# Patient Record
Sex: MALE | Race: WHITE | NOT HISPANIC OR LATINO | Employment: FULL TIME | URBAN - METROPOLITAN AREA
[De-identification: names, ages, dates, MRNs, and addresses within clinical notes are randomized per-mention and may not be internally consistent; named-entity substitution may affect disease eponyms.]

---

## 2024-08-05 ENCOUNTER — HOSPITAL ENCOUNTER (EMERGENCY)
Facility: HOSPITAL | Age: 54
Discharge: HOME | End: 2024-08-05
Attending: EMERGENCY MEDICINE
Payer: COMMERCIAL

## 2024-08-05 VITALS
RESPIRATION RATE: 16 BRPM | SYSTOLIC BLOOD PRESSURE: 110 MMHG | DIASTOLIC BLOOD PRESSURE: 70 MMHG | TEMPERATURE: 98 F | HEIGHT: 68 IN | WEIGHT: 200 LBS | HEART RATE: 79 BPM | OXYGEN SATURATION: 97 % | BODY MASS INDEX: 30.31 KG/M2

## 2024-08-05 DIAGNOSIS — M54.42 ACUTE LEFT-SIDED LOW BACK PAIN WITH LEFT-SIDED SCIATICA: Primary | ICD-10-CM

## 2024-08-05 PROCEDURE — 99284 EMERGENCY DEPT VISIT MOD MDM: CPT | Mod: 25

## 2024-08-05 PROCEDURE — 63600175 PHARM REV CODE 636 W HCPCS: Performed by: EMERGENCY MEDICINE

## 2024-08-05 PROCEDURE — 96372 THER/PROPH/DIAG INJ SC/IM: CPT | Performed by: EMERGENCY MEDICINE

## 2024-08-05 RX ORDER — KETOROLAC TROMETHAMINE 30 MG/ML
15 INJECTION, SOLUTION INTRAMUSCULAR; INTRAVENOUS
Status: COMPLETED | OUTPATIENT
Start: 2024-08-05 | End: 2024-08-05

## 2024-08-05 RX ORDER — HYDROMORPHONE HYDROCHLORIDE 1 MG/ML
1 INJECTION, SOLUTION INTRAMUSCULAR; INTRAVENOUS; SUBCUTANEOUS
Status: COMPLETED | OUTPATIENT
Start: 2024-08-05 | End: 2024-08-05

## 2024-08-05 RX ORDER — PREDNISONE 20 MG/1
40 TABLET ORAL DAILY
Qty: 10 TABLET | Refills: 0 | Status: SHIPPED | OUTPATIENT
Start: 2024-08-05 | End: 2024-08-10

## 2024-08-05 RX ORDER — OXYCODONE AND ACETAMINOPHEN 5; 325 MG/1; MG/1
1 TABLET ORAL EVERY 6 HOURS PRN
Qty: 10 TABLET | Refills: 0 | Status: SHIPPED | OUTPATIENT
Start: 2024-08-05

## 2024-08-05 RX ORDER — DICLOFENAC SODIUM 50 MG/1
50 TABLET, DELAYED RELEASE ORAL 3 TIMES DAILY PRN
Qty: 15 TABLET | Refills: 0 | Status: SHIPPED | OUTPATIENT
Start: 2024-08-05

## 2024-08-05 RX ORDER — METHOCARBAMOL 500 MG/1
1000 TABLET, FILM COATED ORAL 3 TIMES DAILY PRN
Qty: 20 TABLET | Refills: 0 | Status: SHIPPED | OUTPATIENT
Start: 2024-08-05 | End: 2024-08-10

## 2024-08-05 RX ORDER — ORPHENADRINE CITRATE 30 MG/ML
60 INJECTION INTRAMUSCULAR; INTRAVENOUS
Status: COMPLETED | OUTPATIENT
Start: 2024-08-05 | End: 2024-08-05

## 2024-08-05 RX ADMIN — HYDROMORPHONE HYDROCHLORIDE 1 MG: 1 INJECTION, SOLUTION INTRAMUSCULAR; INTRAVENOUS; SUBCUTANEOUS at 08:08

## 2024-08-05 RX ADMIN — ORPHENADRINE CITRATE 60 MG: 60 INJECTION INTRAMUSCULAR; INTRAVENOUS at 07:08

## 2024-08-05 RX ADMIN — KETOROLAC TROMETHAMINE 15 MG: 30 INJECTION, SOLUTION INTRAMUSCULAR; INTRAVENOUS at 07:08

## 2024-12-01 ENCOUNTER — HOSPITAL ENCOUNTER (EMERGENCY)
Facility: HOSPITAL | Age: 54
Discharge: HOME | End: 2024-12-01
Attending: EMERGENCY MEDICINE
Payer: COMMERCIAL

## 2024-12-01 VITALS — HEART RATE: 80 BPM | OXYGEN SATURATION: 100 % | TEMPERATURE: 99 F

## 2024-12-01 DIAGNOSIS — M54.42 ACUTE LEFT-SIDED LOW BACK PAIN WITH LEFT-SIDED SCIATICA: ICD-10-CM

## 2024-12-01 DIAGNOSIS — M54.32 SCIATICA OF LEFT SIDE: Primary | ICD-10-CM

## 2024-12-01 PROCEDURE — 63600175 PHARM REV CODE 636 W HCPCS: Performed by: EMERGENCY MEDICINE

## 2024-12-01 PROCEDURE — 99284 EMERGENCY DEPT VISIT MOD MDM: CPT | Mod: 25

## 2024-12-01 PROCEDURE — 96372 THER/PROPH/DIAG INJ SC/IM: CPT | Performed by: EMERGENCY MEDICINE

## 2024-12-01 RX ORDER — DEXAMETHASONE SODIUM PHOSPHATE 4 MG/ML
8 INJECTION, SOLUTION INTRA-ARTICULAR; INTRALESIONAL; INTRAMUSCULAR; INTRAVENOUS; SOFT TISSUE
Status: COMPLETED | OUTPATIENT
Start: 2024-12-01 | End: 2024-12-01

## 2024-12-01 RX ORDER — ORPHENADRINE CITRATE 30 MG/ML
60 INJECTION INTRAMUSCULAR; INTRAVENOUS
Status: COMPLETED | OUTPATIENT
Start: 2024-12-01 | End: 2024-12-01

## 2024-12-01 RX ORDER — CYCLOBENZAPRINE HCL 5 MG
5 TABLET ORAL 3 TIMES DAILY PRN
Qty: 30 TABLET | Refills: 0 | Status: SHIPPED | OUTPATIENT
Start: 2024-12-01 | End: 2024-12-11

## 2024-12-01 RX ORDER — NAPROXEN 500 MG/1
500 TABLET ORAL 2 TIMES DAILY WITH MEALS
Qty: 20 TABLET | Refills: 0 | Status: SHIPPED | OUTPATIENT
Start: 2024-12-01 | End: 2024-12-11

## 2024-12-01 RX ORDER — OXYCODONE AND ACETAMINOPHEN 7.5; 325 MG/1; MG/1
1 TABLET ORAL EVERY 6 HOURS PRN
Qty: 12 TABLET | Refills: 0 | Status: SHIPPED | OUTPATIENT
Start: 2024-12-01 | End: 2024-12-04

## 2024-12-01 RX ORDER — KETOROLAC TROMETHAMINE 30 MG/ML
30 INJECTION, SOLUTION INTRAMUSCULAR; INTRAVENOUS
Status: COMPLETED | OUTPATIENT
Start: 2024-12-01 | End: 2024-12-01

## 2024-12-01 RX ADMIN — KETOROLAC TROMETHAMINE 30 MG: 30 INJECTION, SOLUTION INTRAMUSCULAR at 10:12

## 2024-12-01 RX ADMIN — DEXAMETHASONE SODIUM PHOSPHATE 8 MG: 4 INJECTION INTRA-ARTICULAR; INTRALESIONAL; INTRAMUSCULAR; INTRAVENOUS; SOFT TISSUE at 10:12

## 2024-12-01 RX ADMIN — ORPHENADRINE CITRATE 60 MG: 60 INJECTION INTRAMUSCULAR; INTRAVENOUS at 10:12

## 2024-12-01 NOTE — ED PROVIDER NOTES
Encounter Date: 12/1/2024       History     Chief Complaint   Patient presents with    Back Pain     L4-L5 herniated disc, exacerbated pain over the past 3 days.  No relief from aleve, old oxycodone.  Denies LE weakness / changes to bowels/urination     Emergent evaluation of a 54-year-old male presents for lower back pain patient was history of sciatica and L 4 L5 disc herniation saw a orthopedist at an out patient was spine center after being seen in the ER in August was treated symptomatically did not end up doing physical therapy or epidural injection due to his work schedule an symptoms gradually improved.  He reports he re exacerbated his back pain over the past several days after working out gym.  Feels just like past with pain radiating from the left low back down the buttocks and down to the level of the foot with some numbness and tingling.  He reports no definite weakness but weakness due to pain when attempting to walk or sit up.  He reports he was several tablets of the Percocet 5/325 mg at home from his previous back pain he was taken 1 each night for the past few nights without improvement did not take NSAIDs or steroids.  He reports that taking muscle relaxers NSAIDs steroids and opiates last time did not hit improve he was pain which is why he ended up seeing orthopedics.  Symptoms began on Thursday, Thanksgiving so he was unable to see Orthopedics but plans on calling them tomorrow    He denies any associated trauma, urinary retention, bowel/bladder incontinence, abdominal pain, nausea/vomiting, or fever/chills.  He reports the pain radiates down the left leg and has associated paresthesias.  His pain is worse with movement.            Review of patient's allergies indicates:  No Known Allergies  History reviewed. No pertinent past medical history.  History reviewed. No pertinent surgical history.  No family history on file.     Review of Systems   Constitutional:  Negative for activity change,  appetite change, chills, diaphoresis, fatigue and fever.   Respiratory:  Negative for cough, chest tightness, shortness of breath and wheezing.    Cardiovascular:  Negative for chest pain and palpitations.   Gastrointestinal:  Positive for constipation. Negative for abdominal pain, diarrhea, nausea and vomiting.   Genitourinary:  Negative for difficulty urinating, dysuria, frequency and urgency.   Musculoskeletal:  Positive for arthralgias, back pain and myalgias. Negative for neck pain and neck stiffness.   Skin:  Negative for color change.   Neurological:  Negative for dizziness, weakness, light-headedness, numbness and headaches.   Psychiatric/Behavioral:  Negative for confusion.    All other systems reviewed and are negative.      Physical Exam     Initial Vitals   BP Pulse Resp Temp SpO2   12/01/24 0521 12/01/24 0521 12/01/24 0521 12/01/24 0521 12/01/24 0536   (P) 136/71 (P) 82 (P) 16 (P) 98.8 °F (37.1 °C) 100 %      MAP       --                Physical Exam    Nursing note and vitals reviewed.  Constitutional: He appears well-developed and well-nourished. He is not diaphoretic. No distress.   HENT:   Head: Normocephalic and atraumatic.   Right Ear: External ear normal.   Left Ear: External ear normal.   Nose: Nose normal. Mouth/Throat: Oropharynx is clear and moist.   Eyes: Conjunctivae and EOM are normal. Pupils are equal, round, and reactive to light.   Neck: Neck supple. No tracheal deviation present.   Normal range of motion.  Cardiovascular:  Normal rate, regular rhythm, normal heart sounds and intact distal pulses.     Exam reveals no gallop and no friction rub.       No murmur heard.  Pulmonary/Chest: Breath sounds normal. No stridor. No respiratory distress. He has no wheezes. He has no rhonchi. He has no rales. He exhibits no tenderness.   Abdominal: Abdomen is soft. Bowel sounds are normal. He exhibits no distension and no mass. There is no abdominal tenderness. There is no rebound and no guarding.    Musculoskeletal:         General: Tenderness present. No edema.      Cervical back: Normal, normal range of motion and neck supple.      Thoracic back: Normal.      Lumbar back: Tenderness and bony tenderness present. No swelling, edema, deformity, signs of trauma, lacerations or spasms. Decreased range of motion. Positive left straight leg raise test. Negative right straight leg raise test.        Back:       Comments: Patient reports pain radiates down posterior leg to level of the foot.  5/5 strength EHL FHL ankle Amato and plantar flexion bilaterally.  Patient was severe pain attempting to bend to flex or extend at the knee and hip it was 4/5 strength in the left leg unsure if this is due to pain     Neurological: He is alert and oriented to person, place, and time. He has normal strength. No cranial nerve deficit or sensory deficit.   Skin: Skin is warm and dry. No rash noted. No erythema. No pallor.   Psychiatric: He has a normal mood and affect. His behavior is normal. Judgment and thought content normal.         ED Course   Procedures  Labs Reviewed - No data to display       Imaging Results    None          Medications   dexAMETHasone injection 8 mg (8 mg Intramuscular Given 12/1/24 1005)   ketorolac injection 30 mg (30 mg Intramuscular Given 12/1/24 1004)   orphenadrine injection 60 mg (60 mg Intramuscular Given 12/1/24 1005)     Medical Decision Making  Emergent evaluation of a 54-year-old male presents for lower back pain patient was history of sciatica and L 4 L5 disc herniation saw a orthopedist at an out patient was spine center after being seen in the ER in August was treated symptomatically did not end up doing physical therapy or epidural injection due to his work schedule an symptoms gradually improved.  He reports he re exacerbated his back pain over the past several days after working out gym.  Feels just like past with pain radiating from the left low back down the buttocks and down to the  level of the foot with some numbness and tingling.  He reports no definite weakness but weakness due to pain when attempting to walk or sit up.  He reports he was several tablets of the Percocet 5/325 mg at home from his previous back pain he was taken 1 each night for the past few nights without improvement did not take NSAIDs or steroids.  He reports that taking muscle relaxers NSAIDs steroids and opiates last time did not hit improve he was pain which is why he ended up seeing orthopedics.  Symptoms began on Thursday, Thanksgiving so he was unable to see Orthopedics but plans on calling them tomorrow    He denies any associated trauma, urinary retention, bowel/bladder incontinence, abdominal pain, nausea/vomiting, or fever/chills.  He reports the pain radiates down the left leg and has associated paresthesias.  His pain is worse with movement.   On physical exam patient was symptoms classic for sciatica.  Soft nontender abdomen.  No tenderness in the groin.  No true weakness in the left lower extremity normal sensation throughout 2+ DP pulses.  I am not concerned for an intra-abdominal cause I have considered cauda equina syndrome epidural hematoma abscess pass I do not think this is likely.  [facility]    Patient presents for emergent evaluation of acute left low back pain radiating down left leg similar to sciatica episodes in the past that poses a threat to life and/or bodily function.   Differential diagnosis includes but was not limited to sciatica, cauda equina epidural abscess hematoma, fracture subluxation nerve root compression AAA, dissection, UTI pyelo kidney stone.   In the ED patient found to have acute sciatica on the left.      Discharge [facility]     Patient was managed in the ED with IM Norflex 60 mg, IM Toradol 30 mg, Decadron 10 mg patient was not tried steroids in the past will do Decadron this time.  Will go home with different NSAID option, Naprosyn, stronger opiate pain medication, and a different  muscle relaxer and he was going to call orthopedics tomorrow I have also recommended physical therapy  The response to treatment was good.    Patient was discharged in stable condition.  Detailed return precautions discussed.  Patient was told to follow up with primary care physician or specialist based on their diagnosis  Lacy Haney MD      Risk  Prescription drug management.                                      Clinical Impression:  Final diagnoses:  [M54.32] Sciatica of left side (Primary)  [M54.42] Acute left-sided low back pain with left-sided sciatica          ED Disposition Condition    Discharge Stable          ED Prescriptions       Medication Sig Dispense Start Date End Date Auth. Provider    oxyCODONE-acetaminophen (PERCOCET) 7.5-325 mg per tablet Take 1 tablet by mouth every 6 (six) hours as needed for Pain. 12 tablet 12/1/2024 12/4/2024 Lacy Haney MD    cyclobenzaprine (FLEXERIL) 5 MG tablet Take 1 tablet (5 mg total) by mouth 3 (three) times daily as needed for Muscle spasms. 30 tablet 12/1/2024 12/11/2024 Lacy Haney MD    naproxen (NAPROSYN) 500 MG tablet Take 1 tablet (500 mg total) by mouth 2 (two) times daily with meals. for 10 days 20 tablet 12/1/2024 12/11/2024 Lacy Haney MD          Follow-up Information       Follow up With Specialties Details Why Contact Info Additional Information    Dr Pedro, ortho spine  Call in 1 day for followup and possible epidural injection and or physical therapy      [facility] - Emergency Dept Emergency Medicine Go to  If symptoms worsen [address] [facility] [id number]  [phone] 1st floor             Lacy Haney MD  12/01/24 [id number]

## 2024-12-01 NOTE — Clinical Note
"Chaparro"Milagros Menezes was seen and treated in our emergency department on 12/1/2024.  He may return to work on 12/03/2024.       If you have any questions or concerns, please don't hesitate to call.      Lacy Haney MD"